# Patient Record
Sex: FEMALE | NOT HISPANIC OR LATINO | Employment: FULL TIME | URBAN - METROPOLITAN AREA
[De-identification: names, ages, dates, MRNs, and addresses within clinical notes are randomized per-mention and may not be internally consistent; named-entity substitution may affect disease eponyms.]

---

## 2024-05-10 ENCOUNTER — TELEPHONE (OUTPATIENT)
Dept: PRIMARY CARE | Facility: CLINIC | Age: 49
End: 2024-05-10
Payer: COMMERCIAL

## 2024-05-20 ENCOUNTER — OFFICE VISIT (OUTPATIENT)
Dept: PRIMARY CARE | Facility: CLINIC | Age: 49
End: 2024-05-20
Payer: COMMERCIAL

## 2024-05-20 VITALS
HEIGHT: 66 IN | DIASTOLIC BLOOD PRESSURE: 80 MMHG | SYSTOLIC BLOOD PRESSURE: 125 MMHG | HEART RATE: 93 BPM | OXYGEN SATURATION: 95 % | WEIGHT: 245.5 LBS | BODY MASS INDEX: 39.46 KG/M2 | TEMPERATURE: 97.3 F

## 2024-05-20 DIAGNOSIS — Z76.89 ENCOUNTER TO ESTABLISH CARE WITH NEW DOCTOR: Primary | ICD-10-CM

## 2024-05-20 DIAGNOSIS — Z12.31 SCREENING MAMMOGRAM FOR BREAST CANCER: ICD-10-CM

## 2024-05-20 DIAGNOSIS — Z12.11 SCREENING FOR MALIGNANT NEOPLASM OF COLON: ICD-10-CM

## 2024-05-20 DIAGNOSIS — E04.1 NODULE OF LEFT LOBE OF THYROID GLAND: ICD-10-CM

## 2024-05-20 LAB
ALBUMIN SERPL BCP-MCNC: 3.9 G/DL (ref 3.4–5)
ALP SERPL-CCNC: 87 U/L (ref 33–110)
ALT SERPL W P-5'-P-CCNC: 17 U/L (ref 7–45)
ANION GAP SERPL CALC-SCNC: 14 MMOL/L (ref 10–20)
AST SERPL W P-5'-P-CCNC: 14 U/L (ref 9–39)
BILIRUB SERPL-MCNC: 0.4 MG/DL (ref 0–1.2)
BUN SERPL-MCNC: 9 MG/DL (ref 6–23)
CALCIUM SERPL-MCNC: 9.1 MG/DL (ref 8.6–10.6)
CHLORIDE SERPL-SCNC: 101 MMOL/L (ref 98–107)
CHOLEST SERPL-MCNC: 208 MG/DL (ref 0–199)
CHOLESTEROL/HDL RATIO: 6
CO2 SERPL-SCNC: 28 MMOL/L (ref 21–32)
CREAT SERPL-MCNC: 0.84 MG/DL (ref 0.5–1.05)
EGFRCR SERPLBLD CKD-EPI 2021: 87 ML/MIN/1.73M*2
ERYTHROCYTE [DISTWIDTH] IN BLOOD BY AUTOMATED COUNT: 14.3 % (ref 11.5–14.5)
GLUCOSE SERPL-MCNC: 154 MG/DL (ref 74–99)
HCT VFR BLD AUTO: 42.3 % (ref 36–46)
HDLC SERPL-MCNC: 34.8 MG/DL
HGB BLD-MCNC: 13.6 G/DL (ref 12–16)
LDLC SERPL CALC-MCNC: 136 MG/DL
MCH RBC QN AUTO: 28.4 PG (ref 26–34)
MCHC RBC AUTO-ENTMCNC: 32.2 G/DL (ref 32–36)
MCV RBC AUTO: 88 FL (ref 80–100)
NON HDL CHOLESTEROL: 173 MG/DL (ref 0–149)
NRBC BLD-RTO: 0 /100 WBCS (ref 0–0)
PLATELET # BLD AUTO: 274 X10*3/UL (ref 150–450)
POTASSIUM SERPL-SCNC: 4 MMOL/L (ref 3.5–5.3)
PROT SERPL-MCNC: 6.6 G/DL (ref 6.4–8.2)
RBC # BLD AUTO: 4.79 X10*6/UL (ref 4–5.2)
SODIUM SERPL-SCNC: 139 MMOL/L (ref 136–145)
TRIGL SERPL-MCNC: 187 MG/DL (ref 0–149)
VLDL: 37 MG/DL (ref 0–40)
WBC # BLD AUTO: 8 X10*3/UL (ref 4.4–11.3)

## 2024-05-20 PROCEDURE — 85027 COMPLETE CBC AUTOMATED: CPT

## 2024-05-20 PROCEDURE — 80053 COMPREHEN METABOLIC PANEL: CPT

## 2024-05-20 PROCEDURE — 99204 OFFICE O/P NEW MOD 45 MIN: CPT | Performed by: INTERNAL MEDICINE

## 2024-05-20 PROCEDURE — 36415 COLL VENOUS BLD VENIPUNCTURE: CPT

## 2024-05-20 PROCEDURE — 84443 ASSAY THYROID STIM HORMONE: CPT

## 2024-05-20 PROCEDURE — 80061 LIPID PANEL: CPT

## 2024-05-20 ASSESSMENT — ENCOUNTER SYMPTOMS
LOSS OF SENSATION IN FEET: 0
DEPRESSION: 0
OCCASIONAL FEELINGS OF UNSTEADINESS: 0

## 2024-05-20 ASSESSMENT — PATIENT HEALTH QUESTIONNAIRE - PHQ9
SUM OF ALL RESPONSES TO PHQ9 QUESTIONS 1 AND 2: 0
1. LITTLE INTEREST OR PLEASURE IN DOING THINGS: NOT AT ALL
2. FEELING DOWN, DEPRESSED OR HOPELESS: NOT AT ALL
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
## 2024-05-23 LAB — TSH SERPL-ACNC: 1.17 MIU/L (ref 0.44–3.98)

## 2024-05-28 ENCOUNTER — TELEPHONE (OUTPATIENT)
Dept: PRIMARY CARE | Facility: CLINIC | Age: 49
End: 2024-05-28
Payer: COMMERCIAL

## 2024-05-28 NOTE — TELEPHONE ENCOUNTER
----- Message from Michael Owens MD sent at 5/27/2024 11:36 AM EDT -----  Regarding: r  Lab results came back revealing high cholesterol 208 normally should be below 200, LDL should be below 100 yours is 136 and triglycerides should be below 150 yours is 187.  Also concerned about the sugar 154 if this was fasting than we need to check blood sugar fasting with an A1c and urinalysis.  You have been normal thyroid test and normal complete blood count.  To start we need to watch the diet from now 1 low-fat diet low carbohydrate diet.  Stay active and lose weight.  When you can come back fasting to recheck as mentioned above blood sugar A1c and urinalysis.  Consider taking a statin if you cannot do it by changing your diet to low-fat diet maybe you want to give it a trial first should take low-fat low carbs diet cut down on your egg eggs intake on your carrizales fat in general.

## 2024-05-29 ENCOUNTER — APPOINTMENT (OUTPATIENT)
Dept: RADIOLOGY | Facility: CLINIC | Age: 49
End: 2024-05-29
Payer: COMMERCIAL

## 2024-05-29 ENCOUNTER — HOSPITAL ENCOUNTER (OUTPATIENT)
Dept: RADIOLOGY | Facility: CLINIC | Age: 49
Discharge: HOME | End: 2024-05-29
Payer: COMMERCIAL

## 2024-05-29 VITALS — HEIGHT: 66 IN | BODY MASS INDEX: 39.33 KG/M2 | WEIGHT: 244.71 LBS

## 2024-05-29 DIAGNOSIS — Z12.31 SCREENING MAMMOGRAM FOR BREAST CANCER: ICD-10-CM

## 2024-05-29 DIAGNOSIS — E04.1 NODULE OF LEFT LOBE OF THYROID GLAND: ICD-10-CM

## 2024-05-29 PROCEDURE — 77063 BREAST TOMOSYNTHESIS BI: CPT | Performed by: RADIOLOGY

## 2024-05-29 PROCEDURE — 77067 SCR MAMMO BI INCL CAD: CPT | Performed by: RADIOLOGY

## 2024-05-29 PROCEDURE — 76536 US EXAM OF HEAD AND NECK: CPT

## 2024-05-29 PROCEDURE — 76536 US EXAM OF HEAD AND NECK: CPT | Performed by: RADIOLOGY

## 2024-05-29 PROCEDURE — 77067 SCR MAMMO BI INCL CAD: CPT

## 2024-06-03 ENCOUNTER — TELEPHONE (OUTPATIENT)
Dept: PRIMARY CARE | Facility: CLINIC | Age: 49
End: 2024-06-03
Payer: COMMERCIAL

## 2024-06-03 NOTE — TELEPHONE ENCOUNTER
----- Message from Michael Owens MD sent at 6/3/2024  7:41 AM EDT -----  Regarding: r  Please call the patient and inform her that her mammograms are abnormal, she will need further imaging.  Make sure she is aware and make sure the radiology department called her for further testing.

## 2024-06-03 NOTE — TELEPHONE ENCOUNTER
I called Nicki and discussed her mammogram results and reminded her to expect a call for further imaging to be done and if not I gave her the phone number to call them.  Also discussed the ultrasound on the thyroid which is showing goiter and that they recommended follow-up on that to.  As soon as she is done with the mammogram

## 2024-06-06 ENCOUNTER — TELEPHONE (OUTPATIENT)
Dept: PRIMARY CARE | Facility: CLINIC | Age: 49
End: 2024-06-06
Payer: COMMERCIAL

## 2024-06-06 DIAGNOSIS — R92.8 ABNORMALITY OF RIGHT BREAST ON SCREENING MAMMOGRAPHY: Primary | ICD-10-CM

## 2024-06-06 DIAGNOSIS — R93.89 ABNORMAL ULTRASOUND OF THYROID GLAND: Primary | ICD-10-CM

## 2024-06-06 NOTE — TELEPHONE ENCOUNTER
I called the patient now concerning her mammogram results ultrasound of the thyroid results and lab results.  She needs to go back for further the breast possibly ultrasound.  Requisition diagnostic mammogram right side was put in.  Ultrasound of the thyroid discussed she had not referral to see endocrinology for further possible biopsy was done.  Lab results within normal limit.  Come back fasting

## 2024-06-06 NOTE — TELEPHONE ENCOUNTER
Pt called stating she received a letter regarding her previous mammogram and that she is to do additional imaging. She stated that her letter also stated she needs another mammogram request.

## 2024-06-11 ENCOUNTER — HOSPITAL ENCOUNTER (OUTPATIENT)
Dept: RADIOLOGY | Facility: CLINIC | Age: 49
Discharge: HOME | End: 2024-06-11
Payer: COMMERCIAL

## 2024-06-11 ENCOUNTER — TELEPHONE (OUTPATIENT)
Dept: PRIMARY CARE | Facility: CLINIC | Age: 49
End: 2024-06-11
Payer: COMMERCIAL

## 2024-06-11 VITALS — HEIGHT: 66 IN | BODY MASS INDEX: 39.33 KG/M2 | WEIGHT: 244.71 LBS

## 2024-06-11 DIAGNOSIS — R92.8 ABNORMALITY OF RIGHT BREAST ON SCREENING MAMMOGRAPHY: ICD-10-CM

## 2024-06-11 PROCEDURE — 77061 BREAST TOMOSYNTHESIS UNI: CPT | Mod: RIGHT SIDE | Performed by: STUDENT IN AN ORGANIZED HEALTH CARE EDUCATION/TRAINING PROGRAM

## 2024-06-11 PROCEDURE — 77065 DX MAMMO INCL CAD UNI: CPT | Mod: RT

## 2024-06-11 PROCEDURE — 77065 DX MAMMO INCL CAD UNI: CPT | Mod: RIGHT SIDE | Performed by: STUDENT IN AN ORGANIZED HEALTH CARE EDUCATION/TRAINING PROGRAM

## 2024-06-11 NOTE — TELEPHONE ENCOUNTER
Made aware of her mammogram results no need to further investigation resume her yearly screening mammograms

## 2024-06-20 ENCOUNTER — APPOINTMENT (OUTPATIENT)
Dept: PRIMARY CARE | Facility: CLINIC | Age: 49
End: 2024-06-20
Payer: COMMERCIAL

## 2024-06-21 ENCOUNTER — APPOINTMENT (OUTPATIENT)
Dept: PRIMARY CARE | Facility: CLINIC | Age: 49
End: 2024-06-21
Payer: COMMERCIAL

## 2024-06-21 VITALS
BODY MASS INDEX: 40.25 KG/M2 | SYSTOLIC BLOOD PRESSURE: 124 MMHG | TEMPERATURE: 97.1 F | WEIGHT: 246 LBS | DIASTOLIC BLOOD PRESSURE: 78 MMHG

## 2024-06-21 DIAGNOSIS — E11.00 TYPE 2 DIABETES MELLITUS WITH HYPEROSMOLARITY WITHOUT COMA, WITHOUT LONG-TERM CURRENT USE OF INSULIN (MULTI): Primary | ICD-10-CM

## 2024-06-21 DIAGNOSIS — E78.2 MIXED HYPERLIPIDEMIA: ICD-10-CM

## 2024-06-21 LAB
APPEARANCE UR: CLEAR
BILIRUB UR QL STRIP: NEGATIVE
COLOR UR: YELLOW
EST. AVERAGE GLUCOSE BLD GHB EST-MCNC: 131 MG/DL
GLUCOSE UR STRIP-MCNC: NEGATIVE MG/DL
HBA1C MFR BLD: 6.2 %
HGB UR QL STRIP: ABNORMAL
KETONES UR STRIP-MCNC: NEGATIVE MG/DL
LEUKOCYTE ESTERASE UR QL STRIP: NEGATIVE
NITRITE UR QL STRIP: NEGATIVE
PH UR STRIP: 6 [PH]
POC FINGERSTICK BLOOD GLUCOSE: 139 MG/DL (ref 70–100)
PROT UR STRIP-MCNC: NEGATIVE MG/DL
SP GR UR STRIP.AUTO: 1.02
UROBILINOGEN UR STRIP-ACNC: 0.2 E.U./DL

## 2024-06-21 PROCEDURE — 3074F SYST BP LT 130 MM HG: CPT | Performed by: INTERNAL MEDICINE

## 2024-06-21 PROCEDURE — 82962 GLUCOSE BLOOD TEST: CPT | Performed by: INTERNAL MEDICINE

## 2024-06-21 PROCEDURE — 81003 URINALYSIS AUTO W/O SCOPE: CPT | Performed by: INTERNAL MEDICINE

## 2024-06-21 PROCEDURE — 3050F LDL-C >= 130 MG/DL: CPT | Performed by: INTERNAL MEDICINE

## 2024-06-21 PROCEDURE — 36415 COLL VENOUS BLD VENIPUNCTURE: CPT

## 2024-06-21 PROCEDURE — 99214 OFFICE O/P EST MOD 30 MIN: CPT | Performed by: INTERNAL MEDICINE

## 2024-06-21 PROCEDURE — 83036 HEMOGLOBIN GLYCOSYLATED A1C: CPT

## 2024-06-21 PROCEDURE — 3078F DIAST BP <80 MM HG: CPT | Performed by: INTERNAL MEDICINE

## 2024-06-21 RX ORDER — ATORVASTATIN CALCIUM 10 MG/1
10 TABLET, FILM COATED ORAL DAILY
Qty: 100 TABLET | Refills: 3 | Status: SHIPPED | OUTPATIENT
Start: 2024-06-21 | End: 2025-07-26

## 2024-06-21 RX ORDER — METFORMIN HYDROCHLORIDE 500 MG/1
500 TABLET ORAL
Qty: 200 TABLET | Refills: 3 | Status: SHIPPED | OUTPATIENT
Start: 2024-06-21 | End: 2025-06-21

## 2024-06-21 ASSESSMENT — ENCOUNTER SYMPTOMS
LOSS OF SENSATION IN FEET: 0
OCCASIONAL FEELINGS OF UNSTEADINESS: 0
DEPRESSION: 0

## 2024-06-21 ASSESSMENT — PAIN SCALES - GENERAL: PAINLEVEL: 0-NO PAIN
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
## 2024-07-26 ENCOUNTER — APPOINTMENT (OUTPATIENT)
Dept: PRIMARY CARE | Facility: CLINIC | Age: 49
End: 2024-07-26
Payer: COMMERCIAL

## 2024-07-26 VITALS
BODY MASS INDEX: 39.11 KG/M2 | WEIGHT: 239 LBS | DIASTOLIC BLOOD PRESSURE: 75 MMHG | TEMPERATURE: 98.2 F | OXYGEN SATURATION: 94 % | HEART RATE: 92 BPM | SYSTOLIC BLOOD PRESSURE: 115 MMHG

## 2024-07-26 DIAGNOSIS — E11.00 TYPE 2 DIABETES MELLITUS WITH HYPEROSMOLARITY WITHOUT COMA, WITHOUT LONG-TERM CURRENT USE OF INSULIN (MULTI): Primary | ICD-10-CM

## 2024-07-26 DIAGNOSIS — E78.2 MIXED HYPERLIPIDEMIA: ICD-10-CM

## 2024-07-26 PROCEDURE — 3044F HG A1C LEVEL LT 7.0%: CPT | Performed by: INTERNAL MEDICINE

## 2024-07-26 PROCEDURE — 3074F SYST BP LT 130 MM HG: CPT | Performed by: INTERNAL MEDICINE

## 2024-07-26 PROCEDURE — 99213 OFFICE O/P EST LOW 20 MIN: CPT | Performed by: INTERNAL MEDICINE

## 2024-07-26 PROCEDURE — 3050F LDL-C >= 130 MG/DL: CPT | Performed by: INTERNAL MEDICINE

## 2024-07-26 PROCEDURE — 3078F DIAST BP <80 MM HG: CPT | Performed by: INTERNAL MEDICINE

## 2024-07-26 RX ORDER — LANCETS
1 EACH MISCELLANEOUS 2 TIMES DAILY
Qty: 200 EACH | Refills: 3 | Status: SHIPPED | OUTPATIENT
Start: 2024-07-26 | End: 2025-07-26

## 2024-07-26 RX ORDER — BLOOD-GLUCOSE METER
1 EACH MISCELLANEOUS 2 TIMES DAILY
Qty: 200 STRIP | Refills: 3 | Status: SHIPPED | OUTPATIENT
Start: 2024-07-26 | End: 2025-07-26

## 2024-07-26 RX ORDER — DEXTROSE 4 G
1 TABLET,CHEWABLE ORAL 2 TIMES DAILY
Qty: 1 EACH | Refills: 0 | Status: SHIPPED | OUTPATIENT
Start: 2024-07-26

## 2024-07-26 RX ORDER — ISOPROPYL ALCOHOL 70 ML/100ML
1 SWAB TOPICAL 2 TIMES DAILY
Qty: 200 EACH | Refills: 3 | Status: SHIPPED | OUTPATIENT
Start: 2024-07-26

## 2024-07-26 ASSESSMENT — PATIENT HEALTH QUESTIONNAIRE - PHQ9
1. LITTLE INTEREST OR PLEASURE IN DOING THINGS: NOT AT ALL
SUM OF ALL RESPONSES TO PHQ9 QUESTIONS 1 AND 2: 0
2. FEELING DOWN, DEPRESSED OR HOPELESS: NOT AT ALL

## 2024-07-26 ASSESSMENT — ENCOUNTER SYMPTOMS
DEPRESSION: 0
LOSS OF SENSATION IN FEET: 0
OCCASIONAL FEELINGS OF UNSTEADINESS: 0

## 2024-07-26 ASSESSMENT — PAIN SCALES - GENERAL: PAINLEVEL: 0-NO PAIN

## 2024-07-26 NOTE — PROGRESS NOTES
Subjective   Patient ID: Nicki Rios is a 49 y.o. female who presents for Follow-up.    HPI   49 years old female comes in to see me for follow-up on her medical condition.  Recently treated with atorvastatin 10 mg a day and metformin 500 mg twice a day.  Had issues with metformin in the beginning with frequent diarrhea but now it is getting much better.  We had a long discussion concerning medications like Farxiga or Jardiance Rybelsus or Ozempic or Mounjaro.  She will call the insurance and find out which drug is covered.  She also needs a machine to check her blood sugar at home.  She has a blood pressure machine and check herself infrequently so we advised her to check her pressure often and keep a log.  She lost 7 pounds  Review of Systems  12 system review 12 system are negative.  Objective   /75 (BP Location: Left arm, Patient Position: Sitting, BP Cuff Size: Large adult)   Pulse 92   Temp 36.8 °C (98.2 °F) (Temporal)   Wt 108 kg (239 lb)   SpO2 94%   BMI 39.11 kg/m²     Physical Exam  Unchanged on physical examination no signs of failure.  Findings are similar to her last exam few weeks ago  Seeing her endocrinologist next month.  She will let me know what drug is covered so we can start her on soon  Assessment/Plan   Diagnoses and all orders for this visit:  Type 2 diabetes mellitus with hyperosmolarity without coma, without long-term current use of insulin (Multi)  Mixed hyperlipidemia

## 2024-09-23 ENCOUNTER — APPOINTMENT (OUTPATIENT)
Dept: PRIMARY CARE | Facility: CLINIC | Age: 49
End: 2024-09-23
Payer: COMMERCIAL

## 2024-09-23 VITALS
SYSTOLIC BLOOD PRESSURE: 111 MMHG | TEMPERATURE: 97.7 F | WEIGHT: 237 LBS | BODY MASS INDEX: 38.78 KG/M2 | OXYGEN SATURATION: 91 % | DIASTOLIC BLOOD PRESSURE: 74 MMHG | HEART RATE: 92 BPM

## 2024-09-23 DIAGNOSIS — E11.9 TYPE 2 DIABETES MELLITUS WITHOUT COMPLICATION, WITHOUT LONG-TERM CURRENT USE OF INSULIN (MULTI): Primary | ICD-10-CM

## 2024-09-23 DIAGNOSIS — E78.2 MIXED HYPERLIPIDEMIA: ICD-10-CM

## 2024-09-23 LAB
APPEARANCE UR: CLEAR
BILIRUB UR QL STRIP: NEGATIVE
CHOLEST SERPL-MCNC: 196 MG/DL (ref 0–199)
CHOLESTEROL/HDL RATIO: 4.6 (ref 4.2–7)
COLOR UR: YELLOW
GLUCOSE UR STRIP-MCNC: NEGATIVE MG/DL
HBA1C MFR BLD: 6 %
HDLC SERPL-MCNC: 43 MG/DL (ref 40–59)
HGB UR QL STRIP: ABNORMAL
IS PATIENT FASTING: ABNORMAL
KETONES UR STRIP-MCNC: NEGATIVE MG/DL
LDLC SERPL DIRECT ASSAY-MCNC: 116 MG/DL (ref 0–100)
LEUKOCYTE ESTERASE UR QL STRIP: NEGATIVE
NITRITE UR QL STRIP: NEGATIVE
PH UR STRIP: 6.5 [PH]
POC FINGERSTICK BLOOD GLUCOSE: 111 MG/DL (ref 70–100)
PROT UR STRIP-MCNC: NEGATIVE MG/DL
SP GR UR STRIP.AUTO: 1.02
TRIGL SERPL-MCNC: 166 MG/DL
UROBILINOGEN UR STRIP-ACNC: 0.2 E.U./DL

## 2024-09-23 PROCEDURE — 3044F HG A1C LEVEL LT 7.0%: CPT | Performed by: INTERNAL MEDICINE

## 2024-09-23 PROCEDURE — 81003 URINALYSIS AUTO W/O SCOPE: CPT | Performed by: INTERNAL MEDICINE

## 2024-09-23 PROCEDURE — 83036 HEMOGLOBIN GLYCOSYLATED A1C: CPT | Performed by: INTERNAL MEDICINE

## 2024-09-23 PROCEDURE — 3074F SYST BP LT 130 MM HG: CPT | Performed by: INTERNAL MEDICINE

## 2024-09-23 PROCEDURE — 82962 GLUCOSE BLOOD TEST: CPT | Performed by: INTERNAL MEDICINE

## 2024-09-23 PROCEDURE — 80061 LIPID PANEL: CPT | Performed by: INTERNAL MEDICINE

## 2024-09-23 PROCEDURE — 99214 OFFICE O/P EST MOD 30 MIN: CPT | Performed by: INTERNAL MEDICINE

## 2024-09-23 PROCEDURE — 3049F LDL-C 100-129 MG/DL: CPT | Performed by: INTERNAL MEDICINE

## 2024-09-23 PROCEDURE — 3078F DIAST BP <80 MM HG: CPT | Performed by: INTERNAL MEDICINE

## 2024-09-23 RX ORDER — TIRZEPATIDE 2.5 MG/.5ML
2.5 INJECTION, SOLUTION SUBCUTANEOUS WEEKLY
Qty: 12 PEN | Refills: 3 | Status: SHIPPED | OUTPATIENT
Start: 2024-09-23

## 2024-09-23 RX ORDER — METFORMIN HYDROCHLORIDE 500 MG/1
500 TABLET, EXTENDED RELEASE ORAL
COMMUNITY
Start: 2024-08-21

## 2024-09-23 ASSESSMENT — PATIENT HEALTH QUESTIONNAIRE - PHQ9
1. LITTLE INTEREST OR PLEASURE IN DOING THINGS: NOT AT ALL
2. FEELING DOWN, DEPRESSED OR HOPELESS: NOT AT ALL
SUM OF ALL RESPONSES TO PHQ9 QUESTIONS 1 AND 2: 0

## 2024-09-23 ASSESSMENT — ENCOUNTER SYMPTOMS
LOSS OF SENSATION IN FEET: 0
OCCASIONAL FEELINGS OF UNSTEADINESS: 0
DEPRESSION: 0
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
## 2024-09-25 ENCOUNTER — TELEPHONE (OUTPATIENT)
Dept: PRIMARY CARE | Facility: CLINIC | Age: 49
End: 2024-09-25
Payer: COMMERCIAL

## 2024-09-25 NOTE — TELEPHONE ENCOUNTER
----- Message from Michael Owens sent at 9/25/2024 12:20 PM EDT -----  Regarding: r  Lab results from last visit reveals trace of blood in the urine nothing serious but need to be watched.  Your blood sugar 111 A1c is 6 before that was 6.2.  Now we added Mounjaro so hopefully things would be better.  Your LDL is 116 which should be below 70 triglyceride 166 should be below 150.  The best thing to do will be lose weight always watch your diet low-fat low carbohydrate diet and increase atorvastatin to 20 mg a day.  If you need a new prescription will make it 20 mg once a day with your evening meal.  Any question call the office

## 2025-06-03 ENCOUNTER — APPOINTMENT (OUTPATIENT)
Dept: RADIOLOGY | Facility: HOSPITAL | Age: 50
End: 2025-06-03
Payer: COMMERCIAL

## 2025-06-04 ENCOUNTER — HOSPITAL ENCOUNTER (OUTPATIENT)
Dept: RADIOLOGY | Facility: CLINIC | Age: 50
Discharge: HOME | End: 2025-06-04
Payer: COMMERCIAL

## 2025-06-04 DIAGNOSIS — E04.2 NONTOXIC MULTINODULAR GOITER: ICD-10-CM

## 2025-06-04 DIAGNOSIS — R93.89 ABNORMAL FINDINGS ON DIAGNOSTIC IMAGING OF OTHER SPECIFIED BODY STRUCTURES: ICD-10-CM

## 2025-06-04 PROCEDURE — 76536 US EXAM OF HEAD AND NECK: CPT

## 2025-06-04 PROCEDURE — 76536 US EXAM OF HEAD AND NECK: CPT | Performed by: RADIOLOGY

## 2025-06-19 ENCOUNTER — APPOINTMENT (OUTPATIENT)
Dept: PRIMARY CARE | Facility: CLINIC | Age: 50
End: 2025-06-19
Payer: COMMERCIAL

## 2025-06-19 VITALS
OXYGEN SATURATION: 93 % | DIASTOLIC BLOOD PRESSURE: 84 MMHG | TEMPERATURE: 97.3 F | SYSTOLIC BLOOD PRESSURE: 117 MMHG | HEART RATE: 113 BPM | WEIGHT: 221 LBS | BODY MASS INDEX: 36.16 KG/M2

## 2025-06-19 DIAGNOSIS — D64.9 ANEMIA, UNSPECIFIED TYPE: ICD-10-CM

## 2025-06-19 DIAGNOSIS — Z12.11 SCREENING FOR MALIGNANT NEOPLASM OF COLON: ICD-10-CM

## 2025-06-19 DIAGNOSIS — E78.2 MIXED HYPERLIPIDEMIA: ICD-10-CM

## 2025-06-19 DIAGNOSIS — Z12.31 SCREENING MAMMOGRAM FOR BREAST CANCER: ICD-10-CM

## 2025-06-19 DIAGNOSIS — E11.9 TYPE 2 DIABETES MELLITUS WITHOUT COMPLICATION, WITHOUT LONG-TERM CURRENT USE OF INSULIN: Primary | ICD-10-CM

## 2025-06-19 DIAGNOSIS — R53.83 FATIGUE, UNSPECIFIED TYPE: ICD-10-CM

## 2025-06-19 LAB
ALBUMIN SERPL BCP-MCNC: 3.9 G/DL (ref 3.4–5)
ALP SERPL-CCNC: 99 U/L (ref 45–117)
ALT SERPL W P-5'-P-CCNC: 38 U/L (ref 16–63)
ANION GAP SERPL CALC-SCNC: 12 MMOL/L (ref 10–20)
APPEARANCE UR: CLEAR
AST SERPL W P-5'-P-CCNC: 19 U/L (ref 15–37)
BASOPHILS # BLD AUTO: 0.03 X10*3/UL (ref 0.1–1.6)
BASOPHILS NFR BLD AUTO: 0.3 % (ref 0–0.3)
BILIRUB SERPL-MCNC: 0.4 MG/DL (ref 0.2–1)
BILIRUB UR QL STRIP: NEGATIVE
BUN SERPL-MCNC: 8 MG/DL (ref 7–18)
CALCIUM SERPL-MCNC: 9.7 MG/DL (ref 8.5–10.1)
CHLORIDE SERPL-SCNC: 105 MMOL/L (ref 98–107)
CHOLEST SERPL-MCNC: 241 MG/DL (ref 0–199)
CHOLESTEROL/HDL RATIO: 6.2 (ref 4.2–7)
CO2 SERPL-SCNC: 28 MMOL/L (ref 21–32)
COLOR UR: YELLOW
CREAT SERPL-MCNC: 0.84 MG/DL (ref 0.6–1.1)
EGFRCR SERPLBLD CKD-EPI 2021: 86 ML/MIN/1.73M*2
EOSINOPHIL # BLD AUTO: 0.1 X10*3/UL (ref 0.04–0.5)
EOSINOPHIL NFR BLD AUTO: 1.18 % (ref 0.7–7)
ERYTHROCYTE [DISTWIDTH] IN BLOOD BY AUTOMATED COUNT: 15.3 % (ref 11.5–14.5)
GLUCOSE SERPL-MCNC: 102 MG/DL (ref 74–100)
GLUCOSE UR STRIP-MCNC: NEGATIVE MG/DL
HBA1C MFR BLD: 5.8 %
HCT VFR BLD AUTO: 42.4 % (ref 36.6–46.6)
HDLC SERPL-MCNC: 39 MG/DL (ref 40–59)
HGB BLD-MCNC: 15.19 G/DL (ref 12–15.4)
HGB UR QL STRIP: ABNORMAL
IS PATIENT FASTING: ABNORMAL
KETONES UR STRIP-MCNC: NEGATIVE MG/DL
LDLC SERPL DIRECT ASSAY-MCNC: 155 MG/DL (ref 0–100)
LEUKOCYTE ESTERASE UR QL STRIP: NEGATIVE
LYMPHOCYTES # BLD AUTO: 1.49 X10*3/UL (ref 0–6)
LYMPHOCYTES NFR BLD AUTO: 16.77 % (ref 20.5–51.1)
MCH RBC QN AUTO: 30.1 PG (ref 26–32)
MCHC RBC AUTO-ENTMCNC: 35.8 G/DL (ref 31–38)
MCV RBC AUTO: 83.9 FL (ref 80–96)
MONOCYTES # BLD AUTO: 0.48 X10*3/UL (ref 1.6–24.9)
MONOCYTES NFR BLD AUTO: 5.37 % (ref 1.7–9.3)
NEUTROPHILS # BLD AUTO: 6.77 X10*3/UL (ref 1.4–6.5)
NEUTROPHILS NFR BLD AUTO: 76.38 % (ref 42.2–75.2)
NITRITE UR QL STRIP: NEGATIVE
PH UR STRIP: 6 [PH]
PLATELET # BLD AUTO: 298.6 X10*3/UL (ref 150–450)
PMV BLD AUTO: 9.38 FL (ref 7.8–11)
POTASSIUM SERPL-SCNC: 4.2 MMOL/L (ref 3.5–5.1)
PROT SERPL-MCNC: 6.9 G/DL (ref 6.4–8.2)
PROT UR STRIP-MCNC: NEGATIVE MG/DL
RBC # BLD AUTO: 5.05 X10*6/UL (ref 3.9–5.3)
SODIUM SERPL-SCNC: 141 MMOL/L (ref 136–145)
SP GR UR STRIP.AUTO: 1.01
TRIGL SERPL-MCNC: 203 MG/DL
TSH SERPL-ACNC: 0.93 MIU/L (ref 0.44–3.98)
UROBILINOGEN UR STRIP-ACNC: 0.2 E.U./DL
WBC # BLD AUTO: 8.86 X10*3/UL (ref 4.5–10.5)

## 2025-06-19 PROCEDURE — 99214 OFFICE O/P EST MOD 30 MIN: CPT | Performed by: INTERNAL MEDICINE

## 2025-06-19 PROCEDURE — 85025 COMPLETE CBC W/AUTO DIFF WBC: CPT | Performed by: INTERNAL MEDICINE

## 2025-06-19 PROCEDURE — 83036 HEMOGLOBIN GLYCOSYLATED A1C: CPT | Performed by: INTERNAL MEDICINE

## 2025-06-19 PROCEDURE — 3079F DIAST BP 80-89 MM HG: CPT | Performed by: INTERNAL MEDICINE

## 2025-06-19 PROCEDURE — 3044F HG A1C LEVEL LT 7.0%: CPT | Performed by: INTERNAL MEDICINE

## 2025-06-19 PROCEDURE — 3074F SYST BP LT 130 MM HG: CPT | Performed by: INTERNAL MEDICINE

## 2025-06-19 PROCEDURE — 80053 COMPREHEN METABOLIC PANEL: CPT | Performed by: INTERNAL MEDICINE

## 2025-06-19 PROCEDURE — 84443 ASSAY THYROID STIM HORMONE: CPT | Performed by: INTERNAL MEDICINE

## 2025-06-19 RX ORDER — TIRZEPATIDE 2.5 MG/.5ML
2.5 INJECTION, SOLUTION SUBCUTANEOUS WEEKLY
Qty: 12 PEN | Refills: 3 | Status: SHIPPED | OUTPATIENT
Start: 2025-06-19

## 2025-06-19 RX ORDER — ATORVASTATIN CALCIUM 10 MG/1
10 TABLET, FILM COATED ORAL DAILY
Qty: 100 TABLET | Refills: 3 | Status: SHIPPED | OUTPATIENT
Start: 2025-06-19 | End: 2026-07-24

## 2025-06-19 ASSESSMENT — ENCOUNTER SYMPTOMS
LOSS OF SENSATION IN FEET: 0
OCCASIONAL FEELINGS OF UNSTEADINESS: 0
DEPRESSION: 0

## 2025-06-19 ASSESSMENT — PATIENT HEALTH QUESTIONNAIRE - PHQ9
SUM OF ALL RESPONSES TO PHQ9 QUESTIONS 1 AND 2: 0
2. FEELING DOWN, DEPRESSED OR HOPELESS: NOT AT ALL
1. LITTLE INTEREST OR PLEASURE IN DOING THINGS: NOT AT ALL

## 2025-06-19 NOTE — PROGRESS NOTES
Subjective   Patient ID: Nicki Rios is a 49 y.o. female who presents for Follow-up (Diabetes).    HPI   49 years old female comes in to see me today for a regular checkup and follow-up on her condition.  We treated her for hyperlipidemia and diabetes mellitus type 2.  Her medications reviewed and reconciled she is taking atorvastatin 10 mg a day, Mounjaro 2.5 mg every week.  No more Glucophage or metformin.  He sees Dr. Shafer this coming Monday for her thyroid condition.  She needs mammogram and colonoscopy.  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  Still smoking over a pack a day since age 17 she had the time off smoking for 10 years unfortunately she is restarted again.  Her daughter Tiara is doing very well and living by herself working from  Review of Systems  12 system review 12 system are negative  Objective   /84 (BP Location: Left arm, Patient Position: Sitting, BP Cuff Size: Large adult)   Pulse (!) 113   Temp 36.3 °C (97.3 °F) (Temporal)   Wt 100 kg (221 lb)   SpO2 93%   BMI 36.16 kg/m²     Physical Exam  Alert oriented no acute distress nonicteric sclera or jaundice.  Face symmetrical.  Nerves intact.  Neck supple no masses lymph nodes.  Megaly or jugular venous distention no carotid bruits.  Lungs clear diminished no rales or wheezing.  Heart normal S1 and S2 regular rhythm.  Abdomen benign nontender no no aortic or abdominal murmur or bruits.  Neurologically intact.  Skin intact.  CAT scan of the chest revealed central lobular emphysema.  Patient was made aware of that fact and that she need to cut down on her smoking or at least consider stopping.  Colonoscopy and mammograms.  Requisition provided  Assessment/Plan   Diagnoses and all orders for this visit:  Type 2 diabetes mellitus without complication, without long-term current use of insulin  -     Comprehensive Metabolic Panel  -     POCT UA (Automated) docked device  -     Hemoglobin A1c  Anemia,  unspecified type  -     CBC w/5 Part Differential, [facility]  Mixed hyperlipidemia  -     Lipid Panel  Fatigue, unspecified type  -     Thyroid Stimulating Hormone  Screening for malignant neoplasm of colon  -     Colonoscopy Screening; Average Risk Patient; Future  Screening mammogram for breast cancer  -     BI mammo bilateral screening tomosynthesis; Future

## 2025-06-28 ENCOUNTER — HOSPITAL ENCOUNTER (OUTPATIENT)
Dept: RADIOLOGY | Facility: CLINIC | Age: 50
Discharge: HOME | End: 2025-06-28
Payer: COMMERCIAL

## 2025-06-28 DIAGNOSIS — Z12.31 SCREENING MAMMOGRAM FOR BREAST CANCER: ICD-10-CM

## 2025-06-28 PROCEDURE — 77063 BREAST TOMOSYNTHESIS BI: CPT | Performed by: RADIOLOGY

## 2025-06-28 PROCEDURE — 77067 SCR MAMMO BI INCL CAD: CPT | Performed by: RADIOLOGY

## 2025-06-28 PROCEDURE — 77067 SCR MAMMO BI INCL CAD: CPT

## 2025-06-29 DIAGNOSIS — R92.8 ABNORMAL MAMMOGRAM OF LEFT BREAST: Primary | ICD-10-CM

## 2025-06-30 ENCOUNTER — TELEPHONE (OUTPATIENT)
Dept: PRIMARY CARE | Facility: CLINIC | Age: 50
End: 2025-06-30
Payer: COMMERCIAL

## 2025-06-30 NOTE — TELEPHONE ENCOUNTER
----- Message from Michael Owens sent at 6/29/2025  9:55 AM EDT -----  Regarding: r  Radiology advise more imaging on your left breast compression spot may be ultrasounds or what ever needed to clarify the abnormalities you are seen.  So please call them at  to schedule those recommended imaging.

## 2025-07-01 ENCOUNTER — TELEPHONE (OUTPATIENT)
Dept: PRIMARY CARE | Facility: CLINIC | Age: 50
End: 2025-07-01
Payer: COMMERCIAL

## 2025-07-01 DIAGNOSIS — R92.8 ABNORMAL MAMMOGRAM OF LEFT BREAST: Primary | ICD-10-CM

## 2025-07-02 ENCOUNTER — APPOINTMENT (OUTPATIENT)
Dept: RADIOLOGY | Facility: CLINIC | Age: 50
End: 2025-07-02
Payer: COMMERCIAL

## 2025-07-09 DIAGNOSIS — E04.1 THYROID NODULE: Primary | ICD-10-CM

## 2025-07-10 ENCOUNTER — HOSPITAL ENCOUNTER (OUTPATIENT)
Dept: RADIOLOGY | Facility: CLINIC | Age: 50
Discharge: HOME | End: 2025-07-10
Payer: COMMERCIAL

## 2025-07-10 ENCOUNTER — TELEPHONE (OUTPATIENT)
Dept: PRIMARY CARE | Facility: CLINIC | Age: 50
End: 2025-07-10
Payer: COMMERCIAL

## 2025-07-10 DIAGNOSIS — R92.8 ABNORMAL MAMMOGRAM OF LEFT BREAST: ICD-10-CM

## 2025-07-10 PROCEDURE — 76642 ULTRASOUND BREAST LIMITED: CPT | Mod: LEFT SIDE | Performed by: RADIOLOGY

## 2025-07-10 PROCEDURE — 77061 BREAST TOMOSYNTHESIS UNI: CPT | Mod: LEFT SIDE | Performed by: RADIOLOGY

## 2025-07-10 PROCEDURE — 77061 BREAST TOMOSYNTHESIS UNI: CPT | Mod: LT

## 2025-07-10 PROCEDURE — 77065 DX MAMMO INCL CAD UNI: CPT | Mod: LEFT SIDE | Performed by: RADIOLOGY

## 2025-07-10 PROCEDURE — 76982 USE 1ST TARGET LESION: CPT | Mod: LT

## 2025-07-10 PROCEDURE — 76642 ULTRASOUND BREAST LIMITED: CPT | Mod: LT

## 2025-07-10 NOTE — TELEPHONE ENCOUNTER
I called the patient this afternoon to discuss her mammograms and what they found the epic concerning biopsy on the thyroid the new medication she was supposed to start taking vitamin D.  She is already on atorvastatin 20 mg a day.  Mounjaro.  Repeat mammogram and ultrasound in 6 months which would be by next January 2026.  Anything you need let me know she agreed to that

## 2025-07-23 DIAGNOSIS — E78.2 MIXED HYPERLIPIDEMIA: ICD-10-CM

## 2025-07-23 RX ORDER — ATORVASTATIN CALCIUM 20 MG/1
20 TABLET, FILM COATED ORAL DAILY
Qty: 100 TABLET | Refills: 3 | Status: SHIPPED | OUTPATIENT
Start: 2025-07-23 | End: 2026-08-27

## 2025-07-23 NOTE — TELEPHONE ENCOUNTER
Patient say pharmacy will not refill the 10 mg any more it's to soon .  Please send Rx for 20 mg current dosage to pharmacy.

## 2025-08-05 ENCOUNTER — HOSPITAL ENCOUNTER (OUTPATIENT)
Dept: RADIOLOGY | Facility: HOSPITAL | Age: 50
Discharge: HOME | End: 2025-08-05
Payer: COMMERCIAL

## 2025-08-05 VITALS
HEIGHT: 66 IN | SYSTOLIC BLOOD PRESSURE: 124 MMHG | DIASTOLIC BLOOD PRESSURE: 87 MMHG | WEIGHT: 220 LBS | OXYGEN SATURATION: 97 % | HEART RATE: 80 BPM | RESPIRATION RATE: 20 BRPM | TEMPERATURE: 98.2 F | BODY MASS INDEX: 35.36 KG/M2

## 2025-08-05 DIAGNOSIS — E04.1 THYROID NODULE: ICD-10-CM

## 2025-08-05 PROCEDURE — 2500000004 HC RX 250 GENERAL PHARMACY W/ HCPCS (ALT 636 FOR OP/ED): Performed by: RADIOLOGY

## 2025-08-05 PROCEDURE — 10005 FNA BX W/US GDN 1ST LES: CPT

## 2025-08-05 PROCEDURE — 88112 CYTOPATH CELL ENHANCE TECH: CPT | Mod: TC | Performed by: INTERNAL MEDICINE

## 2025-08-05 RX ORDER — LIDOCAINE HYDROCHLORIDE 10 MG/ML
INJECTION, SOLUTION EPIDURAL; INFILTRATION; INTRACAUDAL; PERINEURAL
Status: COMPLETED | OUTPATIENT
Start: 2025-08-05 | End: 2025-08-05

## 2025-08-05 RX ADMIN — LIDOCAINE HYDROCHLORIDE 10 ML: 10 INJECTION, SOLUTION EPIDURAL; INFILTRATION; INTRACAUDAL; PERINEURAL at 13:18

## 2025-08-05 ASSESSMENT — PAIN SCALES - GENERAL
PAINLEVEL_OUTOF10: 0 - NO PAIN
PAINLEVEL_OUTOF10: 0 - NO PAIN

## 2025-08-05 ASSESSMENT — PAIN - FUNCTIONAL ASSESSMENT
PAIN_FUNCTIONAL_ASSESSMENT: 0-10
PAIN_FUNCTIONAL_ASSESSMENT: 0-10

## 2025-08-05 NOTE — Clinical Note
3 passes taken at this time left thyroid - placed in cytolyt. Taken to lab per RN . Bandaid placed neck area no bleeding noted

## 2025-08-05 NOTE — POST-PROCEDURE NOTE
Interventional Radiology Brief Postprocedure Note    Attending: Elan    Assistant: None    Diagnosis: Thyroid nodule    Description of procedure: Thyroid FNA     Anesthesia:  Local    Complications: None    Estimated Blood Loss: none    Medications (Filter: Administrations occurring from 1307 to 1331 on 08/05/25) As of 08/05/25 1331      lidocaine PF (Xylocaine) 10 mg/mL (1 %) injection (mL) Total volume:  10 mL      Date/Time Rate/Dose/Volume Action       08/05/25  1318 10 mL Given                   ID Type Source Tests Collected by Time   1 : left superior thyroid FNA biopsy Non-Gynecologic Cytology THYROID FINE NEEDLE ASPIRATION LEFT SUPERIOR LOBE CYTOLOGY CONSULTATION (NON-GYNECOLOGIC) Yuniel Ansari MD 8/5/2025 1323         See detailed result report with images in PACS.    The patient tolerated the procedure well without incident or complication and is in stable condition.

## 2025-08-06 LAB
LABORATORY COMMENT REPORT: NORMAL
LABORATORY COMMENT REPORT: NORMAL
PATH REPORT.FINAL DX SPEC: NORMAL
PATH REPORT.GROSS SPEC: NORMAL
PATH REPORT.TOTAL CANCER: NORMAL